# Patient Record
Sex: MALE | Race: OTHER | HISPANIC OR LATINO
[De-identification: names, ages, dates, MRNs, and addresses within clinical notes are randomized per-mention and may not be internally consistent; named-entity substitution may affect disease eponyms.]

---

## 2024-09-11 PROBLEM — Z00.00 ENCOUNTER FOR PREVENTIVE HEALTH EXAMINATION: Status: ACTIVE | Noted: 2024-09-11

## 2024-09-11 PROBLEM — K80.20 GALL STONES: Status: ACTIVE | Noted: 2024-09-11

## 2024-09-16 ENCOUNTER — APPOINTMENT (OUTPATIENT)
Dept: SURGERY | Facility: CLINIC | Age: 79
End: 2024-09-16
Payer: MEDICARE

## 2024-09-16 VITALS
BODY MASS INDEX: 28.93 KG/M2 | WEIGHT: 180 LBS | HEART RATE: 65 BPM | OXYGEN SATURATION: 98 % | DIASTOLIC BLOOD PRESSURE: 80 MMHG | SYSTOLIC BLOOD PRESSURE: 134 MMHG | HEIGHT: 66 IN

## 2024-09-16 DIAGNOSIS — E78.5 HYPERLIPIDEMIA, UNSPECIFIED: ICD-10-CM

## 2024-09-16 DIAGNOSIS — Z82.0 FAMILY HISTORY OF EPILEPSY AND OTHER DISEASES OF THE NERVOUS SYSTEM: ICD-10-CM

## 2024-09-16 DIAGNOSIS — K80.20 CALCULUS OF GALLBLADDER W/OUT CHOLECYSTITIS W/OUT OBSTRUCTION: ICD-10-CM

## 2024-09-16 DIAGNOSIS — Z78.9 OTHER SPECIFIED HEALTH STATUS: ICD-10-CM

## 2024-09-16 PROCEDURE — 99204 OFFICE O/P NEW MOD 45 MIN: CPT

## 2024-09-16 RX ORDER — TAMSULOSIN HYDROCHLORIDE 0.4 MG/1
CAPSULE ORAL
Refills: 0 | Status: ACTIVE | COMMUNITY

## 2024-09-16 RX ORDER — PANTOPRAZOLE 40 MG/1
TABLET, DELAYED RELEASE ORAL
Refills: 0 | Status: ACTIVE | COMMUNITY

## 2024-09-16 RX ORDER — LOSARTAN POTASSIUM 100 MG/1
TABLET, FILM COATED ORAL
Refills: 0 | Status: ACTIVE | COMMUNITY

## 2024-09-16 RX ORDER — LINACLOTIDE 72 UG/1
CAPSULE, GELATIN COATED ORAL
Refills: 0 | Status: ACTIVE | COMMUNITY

## 2024-09-16 RX ORDER — AMLODIPINE BESYLATE 5 MG/1
TABLET ORAL
Refills: 0 | Status: ACTIVE | COMMUNITY

## 2024-09-16 RX ORDER — ASPIRIN 81 MG
81 TABLET, DELAYED RELEASE (ENTERIC COATED) ORAL
Refills: 0 | Status: ACTIVE | COMMUNITY

## 2024-09-16 RX ORDER — ATORVASTATIN CALCIUM 80 MG/1
TABLET, FILM COATED ORAL
Refills: 0 | Status: ACTIVE | COMMUNITY

## 2024-09-16 NOTE — PHYSICAL EXAM
[Alert] : alert [Oriented to Person] : oriented to person [Oriented to Place] : oriented to place [Oriented to Time] : oriented to time [Calm] : calm [Abdominal Masses] : No abdominal masses [Abdomen Tenderness] : ~T ~M No abdominal tenderness [de-identified] : He  is alert, well-groomed, and in NAD   [de-identified] : anicteric.  Nasal mucosa pink, septum midline. Oral mucosa pink.  Tongue midline, Pharynx without exudates.   [de-identified] : Neck supple. Trachea midline. Thyroid isthmus barely palpable, lobes not felt.

## 2024-09-16 NOTE — PHYSICAL EXAM
[Alert] : alert [Oriented to Person] : oriented to person [Oriented to Place] : oriented to place [Oriented to Time] : oriented to time [Calm] : calm [Abdominal Masses] : No abdominal masses [Abdomen Tenderness] : ~T ~M No abdominal tenderness [de-identified] : He  is alert, well-groomed, and in NAD   [de-identified] : anicteric.  Nasal mucosa pink, septum midline. Oral mucosa pink.  Tongue midline, Pharynx without exudates.   [de-identified] : Neck supple. Trachea midline. Thyroid isthmus barely palpable, lobes not felt.

## 2024-09-16 NOTE — ASSESSMENT
[FreeTextEntry1] : Impression: cholelithiasis - patient wants to  have his GB surgically removed to avoid  complications.

## 2024-09-16 NOTE — DATA REVIEWED
[FreeTextEntry1] : Exam requested by: DOMINIC GOMEZ PAC 37-22 [address] [facility] [address] SITE PERFORMED: [facility] MARIANAPRISCILLA [facility] SITE PHONE: [phone] Patient: JOSEPH ALANIZ YOB: 1945 Phone: [phone] MRN: [id number] Acc: [id number] Date of Exam: 07-   EXAM:  CT ABDOMEN AND PELVIS WITH AND WITHOUT CONTRAST  Note - This patient has received 0 CT studies and 0 Myocardial Perfusion studies within our network over the previous 12 month period.  HISTORY:  Microscopic hematuria.   TECHNIQUE: CT of the abdomen and pelvis is performed. Contrast Technique: Without and With  IV Contrast: 100 ml of Omnipaque 350 was injected. 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 Axial, coronal, and sagittal.  One or more of the following dose reduction techniques were used: automated exposure control, adjustment of the mA and/or kV according to patient size, use of iterative reconstruction technique.   COMPARISON:  None  FINDINGS: VISUALIZED PORTION OF CHEST: Unremarkable.  FINDINGS: ABDOMEN/PELVIS  LIVER: Unremarkable.  BILIARY: Several calcified subcentimeter gallstones are noted in the gallbladder lumen.  PANCREAS: Unremarkable.  SPLEEN: Unremarkable.  ADRENAL GLANDS: Unremarkable.  KIDNEYS: The kidneys are normal in size, shape, and position. There is a 1 mm calcified nonobstructing stone in a lower pole calyx of the right kidney. There are no other renal calculi or hydronephrosis. The kidneys demonstrate symmetric normal enhancement without evidence of enhancing mass lesion.  URETERS: Unremarkable.  URINARY BLADDER: Unremarkable. Collapsed penile pump reservoir compresses the right anterior aspect of the bladder.  REPRODUCTIVE ORGANS: The prostate is slightly enlarged, measuring 5.3 cm transversely by 4.2 cm AP by 4.5 cm craniocaudad.  BOWEL: There is extensive sigmoid diverticulosis.  PERITONEUM: Unremarkable.  LYMPH NODES: Unremarkable.  VASCULATURE: Unremarkable.  SOFT TISSUES: Unremarkable.  BONES: Unremarkable.  IMPRESSION:    Tiny calcified nonobstructing right renal stone. No other findings involving the kidneys.  Slightly enlarged prostate.  Penile pump as above.  Sigmoid diverticulosis without specific evidence of diverticulitis  Gallstones.  Thank you for the opportunity to participate in the care of this patient.     GUANACO DOWD MD  - Electronically Signed: 07- 3:33 PM  Physician to Physician Direct Line is: [phone]

## 2024-09-16 NOTE — HISTORY OF PRESENT ILLNESS
Additional Notes: Patient consent was obtained to proceed with the visit and recommended plan of care after discussion of all risks and benefits, including the risks of COVID-19 exposure. Detail Level: Simple [de-identified] : Mr. JOSEPH ALANIZ is a 79 year  old patient who was referred by Dr. Cody Oviedo  with the chief complaint of having GB stones.   He reports no nausea or vomiting and no history of jaundice, acholia or acholuria.   Appetite is good and weight is stable.   He   has no family history of biliary tract disease. Mr. ALANIZ  is s/p CT abdomen and pelvis on 07/13/2024 that showed Several calcified sub centimeter gallstones on 07/13/2024.

## 2024-09-16 NOTE — CONSULT LETTER
[Dear  ___] : Dear  [unfilled], [Consult Letter:] : I had the pleasure of evaluating your patient, [unfilled]. [Please see my note below.] : Please see my note below. [Consult Closing:] : Thank you very much for allowing me to participate in the care of this patient.  If you have any questions, please do not hesitate to contact me. [Sincerely,] : Sincerely, [FreeTextEntry3] : Curtis Ivan MD, FACS

## 2024-09-16 NOTE — HISTORY OF PRESENT ILLNESS
[de-identified] : Mr. JOSEPH ALANIZ is a 79 year  old patient who was referred by Dr. Cody Oviedo  with the chief complaint of having GB stones.   He reports no nausea or vomiting and no history of jaundice, acholia or acholuria.   Appetite is good and weight is stable.   He   has no family history of biliary tract disease. Mr. ALANIZ  is s/p CT abdomen and pelvis on 07/13/2024 that showed Several calcified sub centimeter gallstones on 07/13/2024.

## 2024-09-16 NOTE — PLAN
[FreeTextEntry1] : Mr. ALANIZ  was told significance of findings, options, risks and benefits were explained.  Informed consent for laparoscopic/possible open  cholecystectomy  and potential risks, benefits and alternatives (surgical options were discussed including non-surgical options or the option of no surgery) to the planned surgery were discussed in depth.  All surgical options were discussed including non-surgical treatments.  He wishes to proceed with surgery.  We will plan for surgery on at the next available date, pending any required insurance pre-certification or pre-approval. He agrees to obtain any necessary pre-operative evaluations and testing prior to surgery. Patient instructed to maintain a fat-free diet, and to seek immediate medical attention with any acute change or worsening of symptoms, including but not limited to abdominal pain, fever, chills, nausea, vomiting, or yellowing of the skin.  Patient advised to seek immediate medical attention with any acute change in symptoms or with the development of any new or worsening symptoms.  Patient's questions and concerns addressed to patient's satisfaction, and patient verbalized an understanding of the information discussed.

## 2024-09-16 NOTE — DATA REVIEWED
[FreeTextEntry1] : Exam requested by: DOMINIC GOMEZ PAC 37-22 [address] [facility] [address] SITE PERFORMED: [facility] MARIANAPRISCILLA [facility] SITE PHONE: [phone] Patient: JOSEPH ALANIZ YOB: 1945 Phone: [phone] MRN: [id number] Acc: [id number] Date of Exam: 07-   EXAM:  CT ABDOMEN AND PELVIS WITH AND WITHOUT CONTRAST  Note - This patient has received 0 CT studies and 0 Myocardial Perfusion studies within our network over the previous 12 month period.  HISTORY:  Microscopic hematuria.   TECHNIQUE: CT of the abdomen and pelvis is performed. Contrast Technique: Without and With  IV Contrast: 100 ml of Omnipaque 350 was injected. 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 Axial, coronal, and sagittal.  One or more of the following dose reduction techniques were used: automated exposure control, adjustment of the mA and/or kV according to patient size, use of iterative reconstruction technique.   COMPARISON:  None  FINDINGS: VISUALIZED PORTION OF CHEST: Unremarkable.  FINDINGS: ABDOMEN/PELVIS  LIVER: Unremarkable.  BILIARY: Several calcified subcentimeter gallstones are noted in the gallbladder lumen.  PANCREAS: Unremarkable.  SPLEEN: Unremarkable.  ADRENAL GLANDS: Unremarkable.  KIDNEYS: The kidneys are normal in size, shape, and position. There is a 1 mm calcified nonobstructing stone in a lower pole calyx of the right kidney. There are no other renal calculi or hydronephrosis. The kidneys demonstrate symmetric normal enhancement without evidence of enhancing mass lesion.  URETERS: Unremarkable.  URINARY BLADDER: Unremarkable. Collapsed penile pump reservoir compresses the right anterior aspect of the bladder.  REPRODUCTIVE ORGANS: The prostate is slightly enlarged, measuring 5.3 cm transversely by 4.2 cm AP by 4.5 cm craniocaudad.  BOWEL: There is extensive sigmoid diverticulosis.  PERITONEUM: Unremarkable.  LYMPH NODES: Unremarkable.  VASCULATURE: Unremarkable.  SOFT TISSUES: Unremarkable.  BONES: Unremarkable.  IMPRESSION:    Tiny calcified nonobstructing right renal stone. No other findings involving the kidneys.  Slightly enlarged prostate.  Penile pump as above.  Sigmoid diverticulosis without specific evidence of diverticulitis  Gallstones.  Thank you for the opportunity to participate in the care of this patient.     GUANACO DOWD MD  - Electronically Signed: 07- 3:33 PM  Physician to Physician Direct Line is: [phone]

## 2024-09-20 ENCOUNTER — OUTPATIENT (OUTPATIENT)
Dept: OUTPATIENT SERVICES | Facility: HOSPITAL | Age: 79
LOS: 1 days | End: 2024-09-20
Payer: COMMERCIAL

## 2024-09-20 VITALS
SYSTOLIC BLOOD PRESSURE: 130 MMHG | DIASTOLIC BLOOD PRESSURE: 72 MMHG | HEART RATE: 80 BPM | RESPIRATION RATE: 16 BRPM | OXYGEN SATURATION: 98 % | TEMPERATURE: 98 F | WEIGHT: 179.9 LBS | HEIGHT: 66 IN

## 2024-09-20 DIAGNOSIS — K80.20 CALCULUS OF GALLBLADDER WITHOUT CHOLECYSTITIS WITHOUT OBSTRUCTION: ICD-10-CM

## 2024-09-20 DIAGNOSIS — Z96.653 PRESENCE OF ARTIFICIAL KNEE JOINT, BILATERAL: Chronic | ICD-10-CM

## 2024-09-20 DIAGNOSIS — I10 ESSENTIAL (PRIMARY) HYPERTENSION: ICD-10-CM

## 2024-09-20 DIAGNOSIS — Z01.818 ENCOUNTER FOR OTHER PREPROCEDURAL EXAMINATION: ICD-10-CM

## 2024-09-20 DIAGNOSIS — G47.33 OBSTRUCTIVE SLEEP APNEA (ADULT) (PEDIATRIC): ICD-10-CM

## 2024-09-20 LAB — BLD GP AB SCN SERPL QL: SIGNIFICANT CHANGE UP

## 2024-09-20 NOTE — H&P PST ADULT - ASSESSMENT
79 y.o male with PMHx HLD, BPH, HTN, SALONI-CPAP not in use. Now with c/o right upper quadrant pain and discomfort. On w/u found to have calculus of gallbladder and for schedule Laparoscopic Cholecystectomy with Intraoperative Cholangiogram Possible Open on 9/26/24 with Dr House     Dx SALONI more than 10 yrs ago- CPAP recommended- only used for one year and did not f/u with Pulmonary  79 y.o male with calculus of gallbladder and for schedule Laparoscopic Cholecystectomy with Intraoperative Cholangiogram Possible Open on 9/26/24 with Dr House             Dx SALONI more than 10 yrs ago- CPAP recommended- only used for one year

## 2024-09-20 NOTE — H&P PST ADULT - REASON FOR ADMISSION
Laparoscopic Cholecystectomy with Intraoperative Cholangiogram Possible Open on 9/26/24 with Dr House

## 2024-09-20 NOTE — H&P PST ADULT - NSICDXPASTMEDICALHX_GEN_ALL_CORE_FT
PAST MEDICAL HISTORY:  BPH (benign prostatic hyperplasia)     History of calculus of gallbladder     HLD (hyperlipidemia)     HTN (hypertension)      PAST MEDICAL HISTORY:  BPH (benign prostatic hyperplasia)     History of calculus of gallbladder     HLD (hyperlipidemia)     HTN (hypertension)     SALONI (obstructive sleep apnea)

## 2024-09-20 NOTE — H&P PST ADULT - PROBLEM SELECTOR PLAN 2
Pt isntructed to continue antihypertensives as prescribed. Pt instructed to continue antihypertensives as prescribed.

## 2024-09-20 NOTE — H&P PST ADULT - PROBLEM SELECTOR PLAN 1
Pt schedule for Laparoscopic Cholecystectomy with Intraoperative Cholangiogram Possible Open on 9/26/24 with Dr House     Labs drawn by PCP - will f/u result  Pt was seen by PCP for Medical clearance - will f/u report    Pt was  instructed to stop aspirin/ecotrin and all over the counter medication including vitamins and herbal supplements one week prior to surgery   Instructions given on the use of 4% chlorhexidine wash and Pt verbalized understanding of same   Pt Instructed to have nothing by mouth starting midnight day before surgery  Patient is to expect a phone call day before surgery between the hours of 430- 630pm giving arrival time for surgery   Written and verbal preoperative instructions given to patient with understanding verbalized.    Dx SALONI Pt schedule for Laparoscopic Cholecystectomy with Intraoperative Cholangiogram Possible Open on 9/26/24 with Dr House     Labs drawn by PCP - will f/u result  Pt was seen by PCP for Medical clearance - will f/u report  Pt was seen by cardiologist for clearance- will /fu report     Pt was  instructed to stop aspirin/ecotrin and all over the counter medication including vitamins and herbal supplements one week prior to surgery   Instructions given on the use of 4% chlorhexidine wash and Pt verbalized understanding of same   Pt Instructed to have nothing by mouth starting midnight day before surgery  Patient is to expect a phone call day before surgery between the hours of 430- 630pm giving arrival time for surgery   Written and verbal preoperative instructions given to patient with understanding verbalized.    Dx SALONI- CPCP not in use

## 2024-09-20 NOTE — H&P PST ADULT - HISTORY OF PRESENT ILLNESS
79 y.o male with PMHx HLD, BPH, HTN, SALONI-CPAP not in use. Now with c/o right upper quadrant pain and discomfort. On w/u found to have calculus of gallbladder and for schedule Laparoscopic Cholecystectomy with Intraoperative Cholangiogram Possible Open on 9/26/24 with Dr House  79 y.o male with PMHx  HLD, BPH, HTN, SALONI-CPAP not in use. Now with c/o right upper quadrant pain and discomfort. On w/u found to have calculus of gallbladder and for schedule Laparoscopic Cholecystectomy with Intraoperative Cholangiogram Possible Open on 9/26/24 with Dr House

## 2024-09-20 NOTE — H&P PST ADULT - PROBLEM SELECTOR PLAN 3
Dx SALONI more than 10 yrs ago- CPAP recommended- only used for one year and did not f/u with Pulmonary

## 2024-09-23 PROCEDURE — 86901 BLOOD TYPING SEROLOGIC RH(D): CPT

## 2024-09-23 PROCEDURE — 36415 COLL VENOUS BLD VENIPUNCTURE: CPT

## 2024-09-23 PROCEDURE — 86850 RBC ANTIBODY SCREEN: CPT

## 2024-09-23 PROCEDURE — 86900 BLOOD TYPING SEROLOGIC ABO: CPT

## 2024-09-23 PROCEDURE — G0463: CPT

## 2024-09-26 ENCOUNTER — APPOINTMENT (OUTPATIENT)
Dept: SURGERY | Facility: HOSPITAL | Age: 79
End: 2024-09-26

## 2024-09-26 ENCOUNTER — TRANSCRIPTION ENCOUNTER (OUTPATIENT)
Age: 79
End: 2024-09-26

## 2024-09-26 ENCOUNTER — OUTPATIENT (OUTPATIENT)
Dept: OUTPATIENT SERVICES | Facility: HOSPITAL | Age: 79
LOS: 1 days | End: 2024-09-26
Payer: COMMERCIAL

## 2024-09-26 VITALS
RESPIRATION RATE: 16 BRPM | OXYGEN SATURATION: 99 % | SYSTOLIC BLOOD PRESSURE: 144 MMHG | DIASTOLIC BLOOD PRESSURE: 68 MMHG | HEART RATE: 62 BPM | TEMPERATURE: 98 F

## 2024-09-26 VITALS
HEIGHT: 66 IN | WEIGHT: 179.9 LBS | HEART RATE: 65 BPM | TEMPERATURE: 98 F | DIASTOLIC BLOOD PRESSURE: 75 MMHG | SYSTOLIC BLOOD PRESSURE: 121 MMHG | RESPIRATION RATE: 16 BRPM | OXYGEN SATURATION: 98 %

## 2024-09-26 DIAGNOSIS — Z96.653 PRESENCE OF ARTIFICIAL KNEE JOINT, BILATERAL: Chronic | ICD-10-CM

## 2024-09-26 DIAGNOSIS — Z01.818 ENCOUNTER FOR OTHER PREPROCEDURAL EXAMINATION: ICD-10-CM

## 2024-09-26 DIAGNOSIS — K80.20 CALCULUS OF GALLBLADDER WITHOUT CHOLECYSTITIS WITHOUT OBSTRUCTION: ICD-10-CM

## 2024-09-26 LAB — BLD GP AB SCN SERPL QL: SIGNIFICANT CHANGE UP

## 2024-09-26 PROCEDURE — 76000 FLUOROSCOPY <1 HR PHYS/QHP: CPT

## 2024-09-26 PROCEDURE — 86900 BLOOD TYPING SEROLOGIC ABO: CPT

## 2024-09-26 PROCEDURE — 88304 TISSUE EXAM BY PATHOLOGIST: CPT

## 2024-09-26 PROCEDURE — 47563 LAPARO CHOLECYSTECTOMY/GRAPH: CPT | Mod: AS

## 2024-09-26 PROCEDURE — 86901 BLOOD TYPING SEROLOGIC RH(D): CPT

## 2024-09-26 PROCEDURE — 47563 LAPARO CHOLECYSTECTOMY/GRAPH: CPT

## 2024-09-26 PROCEDURE — C1889: CPT

## 2024-09-26 PROCEDURE — 36415 COLL VENOUS BLD VENIPUNCTURE: CPT

## 2024-09-26 PROCEDURE — 88304 TISSUE EXAM BY PATHOLOGIST: CPT | Mod: 26

## 2024-09-26 PROCEDURE — 86850 RBC ANTIBODY SCREEN: CPT

## 2024-09-26 DEVICE — CLIP APPLIER COVIDIEN ENDOCLIP II 10MM MED/LG: Type: IMPLANTABLE DEVICE | Status: FUNCTIONAL

## 2024-09-26 DEVICE — CHOLANGIOGRAM CATH SYMMETRY REDDICK 4FR X 50CM WITH STRAIGHT INTRODUCER: Type: IMPLANTABLE DEVICE | Status: FUNCTIONAL

## 2024-09-26 RX ORDER — VALSARTAN 40 MG/1
1 TABLET ORAL
Refills: 0 | DISCHARGE

## 2024-09-26 RX ORDER — FINASTERIDE 1 MG/1
1 TABLET, FILM COATED ORAL
Refills: 0 | DISCHARGE

## 2024-09-26 RX ORDER — TAMSULOSIN HYDROCHLORIDE 0.4 MG/1
1 CAPSULE ORAL
Refills: 0 | DISCHARGE

## 2024-09-26 RX ORDER — LINACLOTIDE 72 UG/1
1 CAPSULE, GELATIN COATED ORAL
Refills: 0 | DISCHARGE

## 2024-09-26 RX ORDER — SODIUM CHLORIDE 0.9 % (FLUSH) 0.9 %
3 SYRINGE (ML) INJECTION EVERY 8 HOURS
Refills: 0 | Status: DISCONTINUED | OUTPATIENT
Start: 2024-09-26 | End: 2024-09-26

## 2024-09-26 RX ORDER — OXYCODONE HYDROCHLORIDE 30 MG/1
1 TABLET, FILM COATED, EXTENDED RELEASE ORAL
Qty: 8 | Refills: 0
Start: 2024-09-26 | End: 2024-09-27

## 2024-09-26 RX ORDER — FENTANYL CITRATE-0.9 % NACL/PF 300MCG/30
50 PATIENT CONTROLLED ANALGESIA VIAL INJECTION
Refills: 0 | Status: DISCONTINUED | OUTPATIENT
Start: 2024-09-26 | End: 2024-09-26

## 2024-09-26 RX ORDER — AMLODIPINE BESYLATE 10 MG/1
1 TABLET ORAL
Refills: 0 | DISCHARGE

## 2024-09-26 RX ORDER — FENTANYL CITRATE-0.9 % NACL/PF 300MCG/30
25 PATIENT CONTROLLED ANALGESIA VIAL INJECTION
Refills: 0 | Status: DISCONTINUED | OUTPATIENT
Start: 2024-09-26 | End: 2024-09-26

## 2024-09-26 RX ORDER — SODIUM CHLORIDE IRRIG SOLUTION 0.9 %
1000 SOLUTION, IRRIGATION IRRIGATION
Refills: 0 | Status: DISCONTINUED | OUTPATIENT
Start: 2024-09-26 | End: 2024-09-26

## 2024-09-26 RX ADMIN — Medication 50 MICROGRAM(S): at 11:20

## 2024-09-26 RX ADMIN — Medication 50 MICROGRAM(S): at 11:35

## 2024-09-26 NOTE — ASU PATIENT PROFILE, ADULT - NSICDXPASTMEDICALHX_GEN_ALL_CORE_FT
PAST MEDICAL HISTORY:  BPH (benign prostatic hyperplasia)     History of calculus of gallbladder     HLD (hyperlipidemia)     HTN (hypertension)     SALONI (obstructive sleep apnea)

## 2024-09-26 NOTE — ASU DISCHARGE PLAN (ADULT/PEDIATRIC) - ASU DC SPECIAL INSTRUCTIONSFT
Please follow-up with your surgeon in 1 week. Drink plenty of fluids and rest as needed. Call for any fever over 101, nausea, vomiting, severe pain, no passing of gas or bowel movement.       DIET   You may resume your regular diet as normal.       SURGICAL SITES   Remove outer dressing and keep white steri-st  rips in place allowing them to fall off on their own. You may shower 48 hours post-operatively but do not bathe or soak in the water for 1-2 weeks; pat dry. If you notice any signs of surgical site infection (ie. redness, swelling, pain, pus drainage), please seek medical care immediately.       ACTIVITY   Do not lift anything heavier than 10 pounds for 2 weeks and avoid strenuous activity for 4-6 weeks.       PAIN CONTROL   You may take Motrin 600mg (with food) or Tylenol 650mg as needed for mild pain. Stagger one medication 3 hours after the other for maximum pain control. Maximum daily dose of Tylenol should not exceed 4grams/day.  You may take your prescribed oxycodone for severe breakthrough pain not that is not relieved by Tylenol/Motrin. Do not drive or make important decisions while taking this medication and do not take more than 4 pills in 24 hours.     Continue home medications. Please follow-up with your surgeon in 2 weeks. Drink plenty of fluids and rest as needed. Call for any fever over 101, nausea, vomiting, severe pain, no passing of gas or bowel movement.       DIET   You may resume your regular diet as normal.       SURGICAL SITES   Remove outer dressing and keep white steri-st  rips in place allowing them to fall off on their own. You may shower 48 hours post-operatively but do not bathe or soak in the water for 1-2 weeks; pat dry. If you notice any signs of surgical site infection (ie. redness, swelling, pain, pus drainage), please seek medical care immediately.       ACTIVITY   Do not lift anything heavier than 10 pounds for 2 weeks and avoid strenuous activity for 4-6 weeks.       PAIN CONTROL   You may take Motrin 600mg (with food) or Tylenol 650mg as needed for mild pain. Stagger one medication 3 hours after the other for maximum pain control. Maximum daily dose of Tylenol should not exceed 4grams/day.  You may take your prescribed oxycodone for severe breakthrough pain not that is not relieved by Tylenol/Motrin. Do not drive or make important decisions while taking this medication and do not take more than 4 pills in 24 hours.     Continue home medications.

## 2024-09-26 NOTE — ASU DISCHARGE PLAN (ADULT/PEDIATRIC) - CARE PROVIDER_API CALL
Curtis Ivan  Surgery  [address], [address]  [place] [address]  Phone: [phone]  Fax: [phone]  Follow Up Time:    Curtis Ivan  Surgery  [address] [facility]  [place] [address]  Phone: [phone]  Fax: [phone]  Follow Up Time: 2 weeks

## 2024-10-07 LAB — SURGICAL PATHOLOGY STUDY: SIGNIFICANT CHANGE UP

## 2024-10-10 ENCOUNTER — APPOINTMENT (OUTPATIENT)
Dept: SURGERY | Facility: CLINIC | Age: 79
End: 2024-10-10
Payer: MEDICARE

## 2024-10-10 DIAGNOSIS — K80.20 CALCULUS OF GALLBLADDER W/OUT CHOLECYSTITIS W/OUT OBSTRUCTION: ICD-10-CM

## 2024-10-10 PROCEDURE — 99024 POSTOP FOLLOW-UP VISIT: CPT

## (undated) DEVICE — ELCTR BOVIE BLADE 3/4" EXTENDED LENGTH 6"

## (undated) DEVICE — D HELP - CLEARVIEW CLEARIFY SYSTEM

## (undated) DEVICE — TROCAR COVIDIEN VERSAONE BLADED SMOOTH 5MM STANDARD

## (undated) DEVICE — PRECISION CUT SCISSOR MICROLINE MINI ENDOCUT DISP

## (undated) DEVICE — DRSG 2X2

## (undated) DEVICE — DRSG STERISTRIPS 0.5 X 4"

## (undated) DEVICE — DRAPE LIGHT HANDLE COVER (BLUE)

## (undated) DEVICE — PACK GENERAL LAPAROSCOPY

## (undated) DEVICE — SUT POLYSORB 0 30" GU-46

## (undated) DEVICE — SUT MAXON 1 60" T-60

## (undated) DEVICE — DRAPE C ARM UNIVERSAL

## (undated) DEVICE — DRSG MASTISOL

## (undated) DEVICE — VENODYNE/SCD SLEEVE CALF MEDIUM

## (undated) DEVICE — BASIN SET SINGLE

## (undated) DEVICE — ELCTR FOOT CONTROL L WIRE LAPAROSCOPIC

## (undated) DEVICE — INSUFFLATION NDL COVIDIEN SURGINEEDLE VERESS 120MM

## (undated) DEVICE — ELCTR GROUNDING PAD ADULT COVIDIEN

## (undated) DEVICE — GLV 7.5 PROTEXIS (WHITE)

## (undated) DEVICE — NDL HYPO REGULAR BEVEL 25G X 1.5" (BLUE)

## (undated) DEVICE — SYR ASEPTO

## (undated) DEVICE — SOL INJ NS 0.9% 1000ML

## (undated) DEVICE — WARMING BLANKET UPPER ADULT

## (undated) DEVICE — DRSG BANDAID 0.75X3"

## (undated) DEVICE — ENDOCATCH 10MM SPECIMEN POUCH

## (undated) DEVICE — BLADE SURGICAL #10 CARBON

## (undated) DEVICE — TROCAR COVIDIEN VERSAONE BLADED SMOOTH 11MM STANDARD

## (undated) DEVICE — FOR-ESU VALLEYLAB T7E14999DX: Type: DURABLE MEDICAL EQUIPMENT

## (undated) DEVICE — SYR LUER LOK 10CC

## (undated) DEVICE — DRSG TEGADERM 2.5X3"

## (undated) DEVICE — TUBING STRYKER PNEUMOSURE HEATED RTP

## (undated) DEVICE — SUCTION YANKAUER NO CONTROL VENT

## (undated) DEVICE — GLV 7 PROTEXIS (WHITE)

## (undated) DEVICE — SOL IRR POUR NS 0.9% 1500ML

## (undated) DEVICE — SUT POLYSORB 4-0 27" P-12 UNDYED

## (undated) DEVICE — TUBING STRYKEFLOW II SUCTION / IRRIGATOR

## (undated) DEVICE — BLADE SURGICAL #15 CARBON